# Patient Record
Sex: FEMALE | Race: WHITE | Employment: FULL TIME | URBAN - METROPOLITAN AREA
[De-identification: names, ages, dates, MRNs, and addresses within clinical notes are randomized per-mention and may not be internally consistent; named-entity substitution may affect disease eponyms.]

---

## 2017-06-08 PROCEDURE — 87086 URINE CULTURE/COLONY COUNT: CPT | Performed by: EMERGENCY MEDICINE

## 2017-12-29 PROCEDURE — 81001 URINALYSIS AUTO W/SCOPE: CPT | Performed by: INTERNAL MEDICINE

## 2017-12-29 PROCEDURE — 87086 URINE CULTURE/COLONY COUNT: CPT | Performed by: INTERNAL MEDICINE

## 2018-10-29 PROCEDURE — 87086 URINE CULTURE/COLONY COUNT: CPT | Performed by: PHYSICIAN ASSISTANT

## 2018-10-29 PROCEDURE — 87088 URINE BACTERIA CULTURE: CPT | Performed by: PHYSICIAN ASSISTANT

## 2018-10-29 PROCEDURE — 87186 SC STD MICRODIL/AGAR DIL: CPT | Performed by: PHYSICIAN ASSISTANT

## 2018-11-12 PROCEDURE — 88305 TISSUE EXAM BY PATHOLOGIST: CPT | Performed by: INTERNAL MEDICINE

## 2019-09-24 PROCEDURE — 87088 URINE BACTERIA CULTURE: CPT | Performed by: EMERGENCY MEDICINE

## 2019-09-24 PROCEDURE — 87186 SC STD MICRODIL/AGAR DIL: CPT | Performed by: EMERGENCY MEDICINE

## 2019-09-24 PROCEDURE — 87086 URINE CULTURE/COLONY COUNT: CPT | Performed by: EMERGENCY MEDICINE

## 2021-09-13 ENCOUNTER — OFFICE VISIT (OUTPATIENT)
Dept: INTEGRATIVE MEDICINE | Facility: CLINIC | Age: 71
End: 2021-09-13

## 2021-09-13 VITALS
HEART RATE: 90 BPM | WEIGHT: 175.38 LBS | HEIGHT: 61.5 IN | DIASTOLIC BLOOD PRESSURE: 74 MMHG | SYSTOLIC BLOOD PRESSURE: 108 MMHG | BODY MASS INDEX: 32.69 KG/M2 | OXYGEN SATURATION: 97 %

## 2021-09-13 DIAGNOSIS — R05.9 COUGH: Primary | ICD-10-CM

## 2021-09-13 DIAGNOSIS — G93.3 POSTVIRAL FATIGUE SYNDROME: ICD-10-CM

## 2021-09-13 DIAGNOSIS — J40 BRONCHITIS: ICD-10-CM

## 2021-09-13 PROCEDURE — 3008F BODY MASS INDEX DOCD: CPT | Performed by: FAMILY MEDICINE

## 2021-09-13 PROCEDURE — 99204 OFFICE O/P NEW MOD 45 MIN: CPT | Performed by: FAMILY MEDICINE

## 2021-09-13 PROCEDURE — 3074F SYST BP LT 130 MM HG: CPT | Performed by: FAMILY MEDICINE

## 2021-09-13 PROCEDURE — 3078F DIAST BP <80 MM HG: CPT | Performed by: FAMILY MEDICINE

## 2021-09-13 RX ORDER — PROMETHAZINE HYDROCHLORIDE AND CODEINE PHOSPHATE 6.25; 1 MG/5ML; MG/5ML
5 SOLUTION ORAL NIGHTLY PRN
Qty: 80 ML | Refills: 0 | Status: SHIPPED | OUTPATIENT
Start: 2021-09-13 | End: 2021-10-19 | Stop reason: ALTCHOICE

## 2021-09-13 NOTE — PATIENT INSTRUCTIONS
Supplement/Nutrient Support:     Stop Mucinex at this time as this can contribute to your post nasal drip and lead to increased cough     Start Pepcid 20 mg twice per day for 6 week; take 30 minutes before food     Start Flonase 1 spray each nostril for 4

## 2021-09-13 NOTE — PROGRESS NOTES
Nat Padilla is a 73year old female. Patient presents with:  New Patient  Cough: on and off cough for years   Fatigue: - since dx of  covid in july        HPI:   74 yo female h/o of intermittent for over 30 years.  Notes exacerbated by environmental e referral (8/2015)   • Osteopenia    • Prediabetes    • Vitamin D deficiency        CURRENT MEDICATIONS:     Current Outpatient Medications   Medication Sig Dispense Refill   • Promethazine-Codeine 6.25-10 MG/5ML Oral Solution Take 5 mL by mouth nightly as Meetings: Not on file      Marital Status: Not on file  Intimate Partner Violence:       Fear of Current or Ex-Partner: Not on file      Emotionally Abused: Not on file      Physically Abused: Not on file      Sexually Abused: Not on file  Madhavi Saleem. Affect: Mood normal.         Behavior: Behavior normal.         Thought Content: Thought content normal.         ASSESSMENT AND PLAN:     79-year-old female with multiple years of intermittent cough.   Cough seems to be triggered by environmental or infecti Negative Final   • Microscopic Examination 08/03/2021 See below:   Final    Microscopic was indicated and was performed.    • WBC 08/03/2021 None seen  0 - 5 /hpf Final   • RBC 08/03/2021 3-10* 0 - 2 /hpf Final   • Epithelial Cells (non renal) 08/03/2021 0- 5.10 mmol/L Final   • Chloride 08/03/2021 104  98 - 107 mmol/L Final   • Carbon Dioxide 08/03/2021 26.1  22.0 - 29.0 mmol/L Final   • Calcium 08/03/2021 9.0  8.6 - 10.3 mg/dL Final   • Total Protein 08/03/2021 7.1  6.4 - 8.3 g/dL Final   • Albumin 08/03/20 Value:Surgical Pathology Report                         Case: [id number]                                   Authorizing Provider:  Amy Garcia MD       Collected:           04/01/2021 11:33 AM          Ordering Location:     Dermatol Promethazine-Codeine 6.25-10 MG/5ML Oral Solution; Take 5 mL by mouth nightly as needed. Dispense: 80 mL; Refill: 0      Promethazine/codeine provided for patient as as needed use given severity of today's presentation.   Recommend starting OTC famotidine Product and immediately stop use of the Products if a negative reaction should arise.   You should always consult a licensed health care professional before starting any supplement, dietary, or exercise program, especially if you are pregnant or have any pr

## 2021-10-13 ENCOUNTER — OFFICE VISIT (OUTPATIENT)
Dept: INTEGRATIVE MEDICINE | Facility: CLINIC | Age: 71
End: 2021-10-13

## 2021-10-13 VITALS
OXYGEN SATURATION: 96 % | DIASTOLIC BLOOD PRESSURE: 74 MMHG | WEIGHT: 176.19 LBS | BODY MASS INDEX: 32.84 KG/M2 | HEART RATE: 88 BPM | HEIGHT: 61.5 IN | SYSTOLIC BLOOD PRESSURE: 130 MMHG

## 2021-10-13 DIAGNOSIS — R05.9 COUGH: Primary | ICD-10-CM

## 2021-10-13 DIAGNOSIS — G93.3 POSTVIRAL FATIGUE SYNDROME: ICD-10-CM

## 2021-10-13 PROCEDURE — 3075F SYST BP GE 130 - 139MM HG: CPT | Performed by: FAMILY MEDICINE

## 2021-10-13 PROCEDURE — 3078F DIAST BP <80 MM HG: CPT | Performed by: FAMILY MEDICINE

## 2021-10-13 PROCEDURE — 3008F BODY MASS INDEX DOCD: CPT | Performed by: FAMILY MEDICINE

## 2021-10-13 PROCEDURE — 99213 OFFICE O/P EST LOW 20 MIN: CPT | Performed by: FAMILY MEDICINE

## 2021-10-13 NOTE — PROGRESS NOTES
Kena Bernard is a 73year old female. Patient presents with: Follow - Up: feeling better      HPI:   Patient presents for follow-up to discuss ongoing cough. Reports overall improvement in cough with following recommended interventions.   She notes fasting glucose)    • Microhematuria 8/25/2015   • Microscopic hematuria     ordered CT and Urology referral (8/2015)   • Osteopenia    • Prediabetes    • Vitamin D deficiency        CURRENT MEDICATIONS:     Current Outpatient Medications   Medication Sig Status: Not on file  Intimate Partner Violence:       Fear of Current or Ex-Partner: Not on file      Emotionally Abused: Not on file      Physically Abused: Not on file      Sexually Abused: Not on file  Housing Stability:       Unable to Pay for Housing Status   • Specific Gravity 09/21/2021 1.020  1.005 - 1.030 Final   • pH 09/21/2021 6.0  5.0 - 7.5 Final   • Urine-Color 09/21/2021 Yellow  Yellow Final   • Appearance 09/21/2021 Clear  Clear Final   • WBC Esterase 09/21/2021 Negative  Negative Final   • P expressed in micrograms per mL        Antibiotic                 RSLT#1    RSLT#2    RSLT#3    RSLT#4     Ciprofloxacin                  R     Levofloxacin                   R     Nitrofurantoin                 S     Penicillin                     S     Te 08/03/2021 Yellow  Yellow Final   • Appearance 08/03/2021 Clear  Clear Final   • WBC Esterase 08/03/2021 Trace* Negative Final   • Protein 08/03/2021 Negative  Negative/Trace Final   • Glucose 08/03/2021 Negative  Negative Final   • Ketones 08/03/2021 Nega mg/dL Final   • Total Chol/HDL Ratio 08/03/2021 5* 0.0 - 4.5 ratio Final   • Calculated VLDL 08/03/2021 17  <=30 mg/dL Final   • Patient Fasting? 08/03/2021 Yes   Final   • Glucose 08/03/2021 117* 74 - 109 mg/dL Final   • Blood Urea Nitrogen 08/03/2021 16. Basophils Absolute 08/03/2021 0.07  0.00 - 0.10 10ˆ3/µL Final   • nRBC Absolute 08/03/2021 0.000  0.000 - 0.012 10ˆ3/µL Final   • Neutrophils % 08/03/2021 66.9  % Final   • Lymphocytes % 08/03/2021 21.6  % Final   • Monocytes % 08/03/2021 7.9  % Final   • Dietary handout with food triggers for acid reflux provided today        Given further recommendations as below    Orders Placed This Visit:  No orders of the defined types were placed in this encounter.     No orders of the defined types were placed in [facility] Recommend consider avoiding food that may irritate stomach (see attached handout)              Return in about 3 months (around 1/13/2022) for check in . Patient affirmed understanding of plan and all questions were answered.      Media Ali,

## 2021-10-13 NOTE — PATIENT INSTRUCTIONS
Supplement/Nutrient Support:           Continue Flonase 1 spray each nostril for a total 4 weeks - Best to use this in the morning      Breathe Tea by [employer] (1-2 tea bag in water daily) as needed    May incorporate Ginger Tea into your nightly routine a

## (undated) DIAGNOSIS — C50.411 MALIGNANT NEOPLASM OF UPPER-OUTER QUADRANT OF RIGHT BREAST IN FEMALE, ESTROGEN RECEPTOR POSITIVE (HCC): Primary | ICD-10-CM

## (undated) DIAGNOSIS — Z17.0 MALIGNANT NEOPLASM OF UPPER-OUTER QUADRANT OF RIGHT BREAST IN FEMALE, ESTROGEN RECEPTOR POSITIVE (HCC): Primary | ICD-10-CM